# Patient Record
Sex: MALE | URBAN - NONMETROPOLITAN AREA
[De-identification: names, ages, dates, MRNs, and addresses within clinical notes are randomized per-mention and may not be internally consistent; named-entity substitution may affect disease eponyms.]

---

## 2023-04-25 ENCOUNTER — APPOINTMENT (RX ONLY)
Dept: URBAN - NONMETROPOLITAN AREA CLINIC 16 | Facility: CLINIC | Age: 58
Setting detail: DERMATOLOGY
End: 2023-04-25

## 2023-04-25 VITALS — WEIGHT: 177 LBS | HEIGHT: 60 IN

## 2023-04-25 DIAGNOSIS — L44.8 OTHER SPECIFIED PAPULOSQUAMOUS DISORDERS: ICD-10-CM

## 2023-04-25 DIAGNOSIS — Z80.8 FAMILY HISTORY OF MALIGNANT NEOPLASM OF OTHER ORGANS OR SYSTEMS: ICD-10-CM

## 2023-04-25 DIAGNOSIS — T63.33 TOXIC EFFECT OF VENOM OF BROWN RECLUSE SPIDER: ICD-10-CM

## 2023-04-25 DIAGNOSIS — Z71.89 OTHER SPECIFIED COUNSELING: ICD-10-CM

## 2023-04-25 PROBLEM — T63.331A TOXIC EFFECT OF VENOM OF BROWN RECLUSE SPIDER, ACCIDENTAL (UNINTENTIONAL), INITIAL ENCOUNTER: Status: ACTIVE | Noted: 2023-04-25

## 2023-04-25 PROCEDURE — ? TREATMENT REGIMEN

## 2023-04-25 PROCEDURE — ? FULL BODY SKIN EXAM - DECLINED

## 2023-04-25 PROCEDURE — ? COUNSELING

## 2023-04-25 PROCEDURE — 99203 OFFICE O/P NEW LOW 30 MIN: CPT

## 2023-04-25 ASSESSMENT — LOCATION DETAILED DESCRIPTION DERM
LOCATION DETAILED: LEFT MEDIAL POSTERIOR ANKLE
LOCATION DETAILED: LEFT DISTAL CALF
LOCATION DETAILED: LEFT DISTAL DORSAL FOREARM
LOCATION DETAILED: RIGHT VENTRAL PROXIMAL FOREARM
LOCATION DETAILED: RIGHT MEDIAL DISTAL PRETIBIAL REGION

## 2023-04-25 ASSESSMENT — LOCATION SIMPLE DESCRIPTION DERM
LOCATION SIMPLE: LEFT ANKLE
LOCATION SIMPLE: RIGHT PRETIBIAL REGION
LOCATION SIMPLE: RIGHT FOREARM
LOCATION SIMPLE: LEFT CALF
LOCATION SIMPLE: LEFT FOREARM

## 2023-04-25 ASSESSMENT — LOCATION ZONE DERM
LOCATION ZONE: ARM
LOCATION ZONE: LEG

## 2023-04-25 NOTE — PROCEDURE: TREATMENT REGIMEN
Plan: Pt states he was on vacation in [place] the first week of February & he was bitten by a brown recluse spider while down there. \\n\\nPt states he was admitted in the hospital & was given 3 IV bags of antibiotics.\\n\\nToday pt is concerned about scarring, however [facility] did reassured him that he will be able to see when he was bitten. She recommended continuing topical bio oil vitamin, coconut oil etc to help healing/scarring. She also recommended staying out of the sun this summer.
Detail Level: Zone